# Patient Record
Sex: FEMALE | Race: WHITE | NOT HISPANIC OR LATINO | Employment: FULL TIME | URBAN - NONMETROPOLITAN AREA
[De-identification: names, ages, dates, MRNs, and addresses within clinical notes are randomized per-mention and may not be internally consistent; named-entity substitution may affect disease eponyms.]

---

## 2022-02-01 ENCOUNTER — OFFICE VISIT (OUTPATIENT)
Dept: SURGERY | Facility: CLINIC | Age: 32
End: 2022-02-01

## 2022-02-01 VITALS
BODY MASS INDEX: 34.95 KG/M2 | HEIGHT: 65 IN | DIASTOLIC BLOOD PRESSURE: 74 MMHG | WEIGHT: 209.8 LBS | TEMPERATURE: 98.1 F | SYSTOLIC BLOOD PRESSURE: 112 MMHG | OXYGEN SATURATION: 97 % | HEART RATE: 88 BPM

## 2022-02-01 DIAGNOSIS — K58.1 IRRITABLE BOWEL SYNDROME WITH CONSTIPATION: ICD-10-CM

## 2022-02-01 DIAGNOSIS — Z01.818 PREOPERATIVE CLEARANCE: Primary | ICD-10-CM

## 2022-02-01 DIAGNOSIS — R10.10 PAIN OF UPPER ABDOMEN: Primary | ICD-10-CM

## 2022-02-01 PROCEDURE — 99244 OFF/OP CNSLTJ NEW/EST MOD 40: CPT | Performed by: SURGERY

## 2022-02-01 RX ORDER — ALBUTEROL SULFATE 90 UG/1
AEROSOL, METERED RESPIRATORY (INHALATION)
COMMUNITY
Start: 2022-01-05

## 2022-02-01 RX ORDER — OMEPRAZOLE 40 MG/1
CAPSULE, DELAYED RELEASE ORAL
COMMUNITY
Start: 2022-01-05

## 2022-02-01 RX ORDER — BISACODYL 5 MG
TABLET, DELAYED RELEASE (ENTERIC COATED) ORAL
Qty: 4 TABLET | Refills: 0 | Status: SHIPPED | OUTPATIENT
Start: 2022-02-01

## 2022-02-01 RX ORDER — BUDESONIDE AND FORMOTEROL FUMARATE DIHYDRATE 160; 4.5 UG/1; UG/1
2 AEROSOL RESPIRATORY (INHALATION) 2 TIMES DAILY
COMMUNITY
Start: 2022-01-05

## 2022-02-01 RX ORDER — MAGNESIUM CARB/ALUMINUM HYDROX 105-160MG
296 TABLET,CHEWABLE ORAL ONCE
Qty: 195 ML | Refills: 0 | Status: SHIPPED | OUTPATIENT
Start: 2022-02-01 | End: 2022-02-01

## 2022-02-01 RX ORDER — FLUOXETINE HYDROCHLORIDE 20 MG/1
20 CAPSULE ORAL DAILY
COMMUNITY
Start: 2022-01-27

## 2022-02-01 RX ORDER — POLYETHYLENE GLYCOL 3350 17 G/17G
238 POWDER, FOR SOLUTION ORAL ONCE
Qty: 17 PACKET | Refills: 0 | Status: SHIPPED | OUTPATIENT
Start: 2022-02-01 | End: 2022-02-01

## 2022-02-07 ENCOUNTER — OUTSIDE FACILITY SERVICE (OUTPATIENT)
Dept: SURGERY | Facility: CLINIC | Age: 32
End: 2022-02-07

## 2022-02-07 PROCEDURE — 43239 EGD BIOPSY SINGLE/MULTIPLE: CPT | Performed by: SURGERY

## 2022-02-11 ENCOUNTER — TELEPHONE (OUTPATIENT)
Dept: SURGERY | Facility: CLINIC | Age: 32
End: 2022-02-11

## 2022-02-11 NOTE — TELEPHONE ENCOUNTER
CALLED L/M FOR PT NOT FUP ON 02/14/22. BUT STILL KEEP THE 02/21/22 APPT WITH  IN [facility].

## 2022-02-21 ENCOUNTER — OFFICE VISIT (OUTPATIENT)
Dept: SURGERY | Facility: CLINIC | Age: 32
End: 2022-02-21

## 2022-02-21 VITALS
WEIGHT: 206 LBS | HEIGHT: 65 IN | SYSTOLIC BLOOD PRESSURE: 105 MMHG | HEART RATE: 73 BPM | OXYGEN SATURATION: 98 % | TEMPERATURE: 98.7 F | RESPIRATION RATE: 18 BRPM | DIASTOLIC BLOOD PRESSURE: 70 MMHG | BODY MASS INDEX: 34.32 KG/M2

## 2022-02-21 DIAGNOSIS — K58.1 IRRITABLE BOWEL SYNDROME WITH CONSTIPATION: Primary | ICD-10-CM

## 2022-02-21 PROCEDURE — 99213 OFFICE O/P EST LOW 20 MIN: CPT | Performed by: SURGERY

## 2022-02-21 RX ORDER — LINACLOTIDE 145 UG/1
CAPSULE, GELATIN COATED ORAL
COMMUNITY
Start: 2022-02-12

## 2022-02-21 NOTE — PROGRESS NOTES
"Patient: Alfred Rajan    YOB: 1990    Date: 02/21/2022    Primary Care Provider: Lucina Modi MD    Reason for Consultation: Follow-up EGD    Chief Complaint   Patient presents with   • Post-op Follow-up     EGD/ colon       History of present illness:   patient continues to have some pain in the right upper quadrant but vastly improved since prior to her endoscopy.  She has started the Linzess.  Some help with that.    The following portions of the patient's history were reviewed and updated as appropriate: allergies, current medications, past family history, past medical history, past social history, past surgical history and problem list.    Review of Systems   Constitutional: Negative for chills, fever and unexpected weight change.   HENT: Negative for hearing loss, trouble swallowing and voice change.    Eyes: Negative for visual disturbance.   Respiratory: Negative for apnea, cough, chest tightness, shortness of breath and wheezing.    Cardiovascular: Negative for chest pain, palpitations and leg swelling.   Gastrointestinal: Positive for abdominal distention and abdominal pain. Negative for anal bleeding, blood in stool, constipation, diarrhea, nausea, rectal pain and vomiting.   Endocrine: Negative for cold intolerance and heat intolerance.   Genitourinary: Negative for difficulty urinating, dysuria and flank pain.   Musculoskeletal: Negative for back pain and gait problem.   Skin: Negative for color change, rash and wound.   Neurological: Negative for dizziness, syncope, speech difficulty, weakness, light-headedness, numbness and headaches.   Hematological: Negative for adenopathy. Does not bruise/bleed easily.   Psychiatric/Behavioral: Negative for confusion. The patient is not nervous/anxious.        Vital Signs:   Vitals:    02/21/22 1350   BP: 105/70   Pulse: 73   Resp: 18   Temp: 98.7 °F (37.1 °C)   TempSrc: Temporal   SpO2: 98%   Weight: 93.4 kg (206 lb)   Height: 165.1 cm (65\") "       Allergies:  Allergies   Allergen Reactions   • Benadryl [Diphenhydramine] Hives   • Neosporin Af [Miconazole] Hives       Medications:    Current Outpatient Medications:   •  albuterol sulfate  (90 Base) MCG/ACT inhaler, INHALE 2 PUFFS BY MOUTH EVERY 4 TO 6 HOURS IF NEEDED, Disp: , Rfl:   •  budesonide-formoterol (SYMBICORT) 160-4.5 MCG/ACT inhaler, Inhale 2 puffs 2 (Two) Times a Day., Disp: , Rfl:   •  FLUoxetine (PROzac) 20 MG capsule, Take 20 mg by mouth Daily., Disp: , Rfl:   •  Linzess 145 MCG capsule capsule, TAKE 1 CAPSULE BY MOUTH EVERY DAY 30 MINUTES BEFORE FIRST MEAL OF THE DAY ON AN EMPTY STOMACH, Disp: , Rfl:   •  omeprazole (priLOSEC) 40 MG capsule, TAKE 1 CAPSULE BY MOUTH EVERY DAY 30 MINUTES BEFORE BREAKFAST, Disp: , Rfl:   •  bisacodyl (Dulcolax) 5 MG EC tablet, Take 2 tablets at 3 pm & 2 tablets at 7 pm day prior to colonoscopy, Disp: 4 tablet, Rfl: 0    Physical Exam:   General Appearance:    Alert, cooperative, in no acute distress   Abdomen:    Soft and nontender            Cor: Regular rate and rhythm      Results Review:   I reviewed the patient's new clinical results.    Review of Systems was reviewed and confirmed as accurate as documented by the MA.    Assessment / Plan:    1. Irritable bowel syndrome with constipation        Endoscopy work-up was normal.  No source of pain identified.  Gallbladder work-up also normal thus far.  I believe the patient has findings consistent with irritable bowel syndrome.  On Linzess.  Continue this.  Also recommend the FODMAP diet.  Follow-up with her primary care physician.    Electronically signed by Mac Stokes MD  02/21/22

## 2025-07-22 ENCOUNTER — OFFICE VISIT (OUTPATIENT)
Dept: ENDOCRINOLOGY | Facility: CLINIC | Age: 35
End: 2025-07-22
Payer: COMMERCIAL

## 2025-07-22 VITALS
BODY MASS INDEX: 36.79 KG/M2 | DIASTOLIC BLOOD PRESSURE: 82 MMHG | OXYGEN SATURATION: 98 % | HEIGHT: 65 IN | WEIGHT: 220.8 LBS | HEART RATE: 70 BPM | SYSTOLIC BLOOD PRESSURE: 116 MMHG

## 2025-07-22 DIAGNOSIS — E66.812 CLASS 2 OBESITY WITH BODY MASS INDEX (BMI) OF 36.0 TO 36.9 IN ADULT, UNSPECIFIED OBESITY TYPE, UNSPECIFIED WHETHER SERIOUS COMORBIDITY PRESENT: ICD-10-CM

## 2025-07-22 DIAGNOSIS — E88.819 INSULIN RESISTANCE: Primary | ICD-10-CM

## 2025-07-22 DIAGNOSIS — R94.6 ABNORMAL THYROID FUNCTION TEST: ICD-10-CM

## 2025-07-22 LAB
EXPIRATION DATE: NORMAL
EXPIRATION DATE: NORMAL
GLUCOSE BLDC GLUCOMTR-MCNC: 117 MG/DL (ref 70–130)
HBA1C MFR BLD: 5 % (ref 4.5–5.7)
Lab: NORMAL
Lab: NORMAL
T4 FREE SERPL-MCNC: 1.11 NG/DL (ref 0.92–1.68)
T4 SERPL-MCNC: 7.05 MCG/DL (ref 4.5–11.7)
TSH SERPL DL<=0.05 MIU/L-ACNC: 1.94 UIU/ML (ref 0.27–4.2)

## 2025-07-22 PROCEDURE — 82947 ASSAY GLUCOSE BLOOD QUANT: CPT | Performed by: INTERNAL MEDICINE

## 2025-07-22 PROCEDURE — 99204 OFFICE O/P NEW MOD 45 MIN: CPT | Performed by: INTERNAL MEDICINE

## 2025-07-22 PROCEDURE — 84439 ASSAY OF FREE THYROXINE: CPT | Performed by: INTERNAL MEDICINE

## 2025-07-22 PROCEDURE — 36415 COLL VENOUS BLD VENIPUNCTURE: CPT | Performed by: INTERNAL MEDICINE

## 2025-07-22 PROCEDURE — 83036 HEMOGLOBIN GLYCOSYLATED A1C: CPT | Performed by: INTERNAL MEDICINE

## 2025-07-22 PROCEDURE — 84443 ASSAY THYROID STIM HORMONE: CPT | Performed by: INTERNAL MEDICINE

## 2025-07-22 RX ORDER — METFORMIN HYDROCHLORIDE 500 MG/1
TABLET, EXTENDED RELEASE ORAL
Qty: 360 TABLET | Refills: 1 | Status: SHIPPED | OUTPATIENT
Start: 2025-07-22

## 2025-07-22 RX ORDER — DEXAMETHASONE 1 MG
1 TABLET ORAL ONCE
Qty: 1 TABLET | Refills: 0 | Status: SHIPPED | OUTPATIENT
Start: 2025-07-22 | End: 2025-07-22

## 2025-07-22 RX ORDER — ALBUTEROL SULFATE 0.83 MG/ML
2.5 SOLUTION RESPIRATORY (INHALATION) EVERY 4 HOURS PRN
COMMUNITY
Start: 2025-05-01

## 2025-07-22 RX ORDER — CETIRIZINE HYDROCHLORIDE 10 MG/1
10 TABLET ORAL DAILY
COMMUNITY

## 2025-07-22 RX ORDER — MONTELUKAST SODIUM 10 MG/1
1 TABLET ORAL DAILY
COMMUNITY
Start: 2025-06-06

## 2025-07-22 NOTE — PROGRESS NOTES
Chief Complaint   Patient presents with    Insulin Resistance        New patient who is being seen in consultation regarding insulin resistance at the request of Jennifer Kidd APRN HPI   Alfred Rajan is a 34 y.o. female who presents to discuss insulin resistance.     Patient reports that she was previously following with a provider in [place]. Started on semaglutide by this provider but was unable to continue. Insurance would not cover Ozempic.  Patient reports office would not complete a PA for Zepbound.    She reports no personal history of prediabetes or type 2 diabetes. She did not have gestational diabetes during her 2 pregnancies.  Her kids are 16 and 9.  Patients mom had prediabetes.  Patient does report that she was previously prescribed metformin by her gynecologist. She was taking this three times daily for 1 month. She was taking 500 mg XR three times daily.  Patient reports she tolerated this medication well but GYN office would not refill.    Patient reports more trouble losing weight since her hysterectomy. On semeglutide, she was able to get down to 180. She had weight regain to prior baseline after discontinuation.  She does report abnormal thyroid function testing.  She denies recent steroid exposure.    She works to maintain a healthy diet, aims 150 grams of protein per day.   [place] and is physically active.     Apart from her mom, she does not know a lot about her family history.     Past Medical History:   Diagnosis Date    Anemia     Asthma     Bronchitis     Kidney stones     Migraines     Constant    Pneumonia     Umbilical hernia      Past Surgical History:   Procedure Laterality Date    CHOLECYSTECTOMY N/A 2023    COLONOSCOPY N/A     2022    ENDOMETRIAL ABLATION      HERNIA REPAIR      HYSTERECTOMY N/A 2022    Partial    KIDNEY STONE SURGERY Right 12/2023    TUBAL ABDOMINAL LIGATION      WISDOM TOOTH EXTRACTION        Family History   Problem Relation Age of Onset     Cancer Mother     Psoriasis Mother     Diabetes Mother         prediabetes    Cirrhosis Father     No Known Problems Sister     No Known Problems Sister     No Known Problems Brother     No Known Problems Brother     No Known Problems Brother     No Known Problems Brother       Social History     Socioeconomic History    Marital status:    Tobacco Use    Smoking status: Every Day     Current packs/day: 0.00     Average packs/day: 1 pack/day for 10.1 years (10.1 ttl pk-yrs)     Types: Cigarettes     Start date:      Last attempt to quit: 2017     Years since quittin.4     Passive exposure: Past    Smokeless tobacco: Never   Vaping Use    Vaping status: Every Day    Substances: Nicotine, Flavoring    Devices: Disposable    Passive vaping exposure: Yes   Substance and Sexual Activity    Alcohol use: Not Currently    Drug use: Never    Sexual activity: Defer      Allergies   Allergen Reactions    Benadryl [Diphenhydramine] Hives    Neosporin Af [Miconazole] Hives      Current Outpatient Medications on File Prior to Visit   Medication Sig Dispense Refill    albuterol (PROVENTIL) (2.5 MG/3ML) 0.083% nebulizer solution Take 2.5 mg by nebulization Every 4 (Four) Hours As Needed for Wheezing or Shortness of Air.      albuterol sulfate  (90 Base) MCG/ACT inhaler INHALE 2 PUFFS BY MOUTH EVERY 4 TO 6 HOURS IF NEEDED      budesonide-formoterol (SYMBICORT) 160-4.5 MCG/ACT inhaler Inhale 2 puffs 2 (Two) Times a Day.      cetirizine (zyrTEC) 10 MG tablet Take 1 tablet by mouth Daily.      montelukast (SINGULAIR) 10 MG tablet Take 1 tablet by mouth Daily.      [DISCONTINUED] bisacodyl (Dulcolax) 5 MG EC tablet Take 2 tablets at 3 pm & 2 tablets at 7 pm day prior to colonoscopy (Patient not taking: Reported on 2025) 4 tablet 0    [DISCONTINUED] FLUoxetine (PROzac) 20 MG capsule Take 1 capsule by mouth Daily.      [DISCONTINUED] Linzess 145 MCG capsule capsule TAKE 1 CAPSULE BY MOUTH EVERY DAY 30 MINUTES  "BEFORE FIRST MEAL OF THE DAY ON AN EMPTY STOMACH      [DISCONTINUED] omeprazole (priLOSEC) 40 MG capsule TAKE 1 CAPSULE BY MOUTH EVERY DAY 30 MINUTES BEFORE BREAKFAST       No current facility-administered medications on file prior to visit.        Review of Systems   Constitutional:  Positive for fatigue.   Respiratory:  Positive for shortness of breath.    Cardiovascular:  Positive for leg swelling.   Gastrointestinal:  Positive for abdominal distention and abdominal pain.   Endocrine: Positive for polydipsia, polyphagia and polyuria.   Allergic/Immunologic: Positive for environmental allergies.   Neurological:  Positive for dizziness and light-headedness.        Vitals:    07/22/25 0855   BP: 116/82   BP Location: Left arm   Patient Position: Sitting   Cuff Size: Adult   Pulse: 70   SpO2: 98%   Weight: 100 kg (220 lb 12.8 oz)   Height: 165.1 cm (65\")   Body mass index is 36.74 kg/m².     Physical Exam  Vitals reviewed.   Constitutional:       General: She is not in acute distress.  Neck:      Thyroid: No thyromegaly.   Cardiovascular:      Rate and Rhythm: Normal rate and regular rhythm.   Pulmonary:      Effort: Pulmonary effort is normal.      Breath sounds: Normal breath sounds.   Skin:     General: Skin is warm and dry.   Neurological:      General: No focal deficit present.      Mental Status: She is alert.   Psychiatric:         Mood and Affect: Mood and affect normal.         Behavior: Behavior is cooperative.          Labs/Imaging  Labs dated 5/1/2025  TSH 1.26  Total T4 7.38  Free thyroxine index 5.5  Total cholesterol 165  LDL 98  eGFR 99  Alkaline phosphatase 73  ALT 40, elevated  AST 23  Insulin 52.8  Glucose 97     Latest Reference Range & Units 07/22/25 08:58 07/22/25 09:01   Glucose 70 - 130 mg/dL 117    Hemoglobin A1C 4.5 - 5.7 %  5.0       Assessment and Plan    Diagnoses and all orders for this visit:    1. Insulin resistance (Primary)  -     POC Glucose, Blood  -     POC Glycosylated Hemoglobin " (Hb A1C)  -     Dexamethasone Level, Serum; Future  -     Cortisol - AM; Future  Patient elevated insulin level, 52.8 in setting of normal glucose on labs from May 2025  Hemoglobin A1c and point-of-care glucose with acceptable range during visit today.  Discussed factors impacting insulin resistance.  Discussed utilization of metformin for management of insulin resistance.  Plan to resume metformin 500 mg extended release and titrate as tolerated to maximum 2000 mg daily.  eGFR was normal on labs from May 2025  Discussed potential impact of weight loss on insulin resistance.    2. Abnormal thyroid function test  -     TSH; Future  -     T4, Free; Future  -     Free T4 By Dialysis / Mass Spec; Future  -     T4; Future  -     TSH  -     T4, Free  -     Free T4 By Dialysis / Mass Spec  -     T4  Patient had low free thyroxine index on prior labs.  Discussed this is a calculated value.  Plan to repeat thyroid function testing today and obtain additional markers.  Determine next EpiCept review of labs.    3. Class 2 obesity with body mass index (BMI) of 36.0 to 36.9 in adult, unspecified obesity type, unspecified whether serious comorbidity present   BMI 36.7  Patient reports she was previously able to lose weight using compounded semaglutide but this was not sustainable long-term from a cost standpoint.  Plan to screen for endocrine causes of inability to lose weight.  Resuming metformin, as above.  Consider attempt to reinitiate GLP-1 receptor agonist pending lab results and response to metformin.    Other orders  -     dexAMETHasone (DECADRON) 1 MG tablet; Take 1 tablet by mouth 1 (One) Time for 1 dose. Take at 11 PM night before lab draw at 8 AM  Dispense: 1 tablet; Refill: 0  -     metFORMIN ER (GLUCOPHAGE-XR) 500 MG 24 hr tablet; Start 500 mg daily, titrate per instructions, MDD 2000 mg daily  Dispense: 360 tablet; Refill: 1         Return in about 4 months (around 11/22/2025). The patient was instructed to contact  the clinic with any interval questions or concerns.    Electronically signed by: Jazmine Richards MD     Dictated Utilizing Dragon Dictation

## 2025-08-03 LAB — T4 FREE SERPL DIALY-MCNC: 1 NG/DL
